# Patient Record
Sex: MALE | Race: OTHER | NOT HISPANIC OR LATINO | URBAN - METROPOLITAN AREA
[De-identification: names, ages, dates, MRNs, and addresses within clinical notes are randomized per-mention and may not be internally consistent; named-entity substitution may affect disease eponyms.]

---

## 2022-02-16 ENCOUNTER — EMERGENCY (EMERGENCY)
Facility: HOSPITAL | Age: 52
LOS: 1 days | Discharge: HOME | End: 2022-02-16
Attending: EMERGENCY MEDICINE | Admitting: EMERGENCY MEDICINE
Payer: MEDICAID

## 2022-02-16 VITALS
OXYGEN SATURATION: 99 % | TEMPERATURE: 98 F | DIASTOLIC BLOOD PRESSURE: 86 MMHG | RESPIRATION RATE: 17 BRPM | HEART RATE: 112 BPM | SYSTOLIC BLOOD PRESSURE: 155 MMHG

## 2022-02-16 VITALS
SYSTOLIC BLOOD PRESSURE: 143 MMHG | DIASTOLIC BLOOD PRESSURE: 81 MMHG | HEART RATE: 93 BPM | OXYGEN SATURATION: 100 % | TEMPERATURE: 98 F | RESPIRATION RATE: 17 BRPM

## 2022-02-16 LAB
ALBUMIN SERPL ELPH-MCNC: 4.9 G/DL — SIGNIFICANT CHANGE UP (ref 3.3–5)
ALP SERPL-CCNC: 72 U/L — SIGNIFICANT CHANGE UP (ref 40–120)
ALT FLD-CCNC: 24 U/L — SIGNIFICANT CHANGE UP (ref 4–41)
ANION GAP SERPL CALC-SCNC: 11 MMOL/L — SIGNIFICANT CHANGE UP (ref 7–14)
APPEARANCE UR: CLEAR — SIGNIFICANT CHANGE UP
AST SERPL-CCNC: 27 U/L — SIGNIFICANT CHANGE UP (ref 4–40)
BASOPHILS # BLD AUTO: 0.02 K/UL — SIGNIFICANT CHANGE UP (ref 0–0.2)
BASOPHILS NFR BLD AUTO: 0.3 % — SIGNIFICANT CHANGE UP (ref 0–2)
BILIRUB SERPL-MCNC: 0.3 MG/DL — SIGNIFICANT CHANGE UP (ref 0.2–1.2)
BILIRUB UR-MCNC: NEGATIVE — SIGNIFICANT CHANGE UP
BUN SERPL-MCNC: 16 MG/DL — SIGNIFICANT CHANGE UP (ref 7–23)
CALCIUM SERPL-MCNC: 9.1 MG/DL — SIGNIFICANT CHANGE UP (ref 8.4–10.5)
CHLORIDE SERPL-SCNC: 100 MMOL/L — SIGNIFICANT CHANGE UP (ref 98–107)
CO2 SERPL-SCNC: 24 MMOL/L — SIGNIFICANT CHANGE UP (ref 22–31)
COLOR SPEC: YELLOW — SIGNIFICANT CHANGE UP
CREAT SERPL-MCNC: 0.82 MG/DL — SIGNIFICANT CHANGE UP (ref 0.5–1.3)
DIFF PNL FLD: NEGATIVE — SIGNIFICANT CHANGE UP
EOSINOPHIL # BLD AUTO: 0.06 K/UL — SIGNIFICANT CHANGE UP (ref 0–0.5)
EOSINOPHIL NFR BLD AUTO: 1 % — SIGNIFICANT CHANGE UP (ref 0–6)
GLUCOSE SERPL-MCNC: 86 MG/DL — SIGNIFICANT CHANGE UP (ref 70–99)
GLUCOSE UR QL: NEGATIVE — SIGNIFICANT CHANGE UP
HCT VFR BLD CALC: 46.4 % — SIGNIFICANT CHANGE UP (ref 39–50)
HGB BLD-MCNC: 14.4 G/DL — SIGNIFICANT CHANGE UP (ref 13–17)
IANC: 3.93 K/UL — SIGNIFICANT CHANGE UP (ref 1.5–8.5)
IMM GRANULOCYTES NFR BLD AUTO: 0.6 % — SIGNIFICANT CHANGE UP (ref 0–1.5)
KETONES UR-MCNC: NEGATIVE — SIGNIFICANT CHANGE UP
LEUKOCYTE ESTERASE UR-ACNC: NEGATIVE — SIGNIFICANT CHANGE UP
LIDOCAIN IGE QN: 69 U/L — HIGH (ref 7–60)
LYMPHOCYTES # BLD AUTO: 1.68 K/UL — SIGNIFICANT CHANGE UP (ref 1–3.3)
LYMPHOCYTES # BLD AUTO: 27.1 % — SIGNIFICANT CHANGE UP (ref 13–44)
MCHC RBC-ENTMCNC: 22.8 PG — LOW (ref 27–34)
MCHC RBC-ENTMCNC: 31 GM/DL — LOW (ref 32–36)
MCV RBC AUTO: 73.4 FL — LOW (ref 80–100)
MONOCYTES # BLD AUTO: 0.46 K/UL — SIGNIFICANT CHANGE UP (ref 0–0.9)
MONOCYTES NFR BLD AUTO: 7.4 % — SIGNIFICANT CHANGE UP (ref 2–14)
NEUTROPHILS # BLD AUTO: 3.93 K/UL — SIGNIFICANT CHANGE UP (ref 1.8–7.4)
NEUTROPHILS NFR BLD AUTO: 63.6 % — SIGNIFICANT CHANGE UP (ref 43–77)
NITRITE UR-MCNC: NEGATIVE — SIGNIFICANT CHANGE UP
NRBC # BLD: 0 /100 WBCS — SIGNIFICANT CHANGE UP
NRBC # FLD: 0 K/UL — SIGNIFICANT CHANGE UP
PH UR: 7 — SIGNIFICANT CHANGE UP (ref 5–8)
PLATELET # BLD AUTO: 248 K/UL — SIGNIFICANT CHANGE UP (ref 150–400)
POTASSIUM SERPL-MCNC: 4.2 MMOL/L — SIGNIFICANT CHANGE UP (ref 3.5–5.3)
POTASSIUM SERPL-SCNC: 4.2 MMOL/L — SIGNIFICANT CHANGE UP (ref 3.5–5.3)
PROT SERPL-MCNC: 7.6 G/DL — SIGNIFICANT CHANGE UP (ref 6–8.3)
PROT UR-MCNC: ABNORMAL
RBC # BLD: 6.32 M/UL — HIGH (ref 4.2–5.8)
RBC # FLD: 17.3 % — HIGH (ref 10.3–14.5)
SODIUM SERPL-SCNC: 135 MMOL/L — SIGNIFICANT CHANGE UP (ref 135–145)
SP GR SPEC: 1.02 — SIGNIFICANT CHANGE UP (ref 1–1.05)
UROBILINOGEN FLD QL: SIGNIFICANT CHANGE UP
WBC # BLD: 6.19 K/UL — SIGNIFICANT CHANGE UP (ref 3.8–10.5)
WBC # FLD AUTO: 6.19 K/UL — SIGNIFICANT CHANGE UP (ref 3.8–10.5)

## 2022-02-16 PROCEDURE — 74177 CT ABD & PELVIS W/CONTRAST: CPT | Mod: 26,MA,59

## 2022-02-16 PROCEDURE — 99285 EMERGENCY DEPT VISIT HI MDM: CPT

## 2022-02-16 NOTE — ED PROVIDER NOTE - NSFOLLOWUPINSTRUCTIONS_ED_ALL_ED_FT
Advance activity as tolerated. Our discharge center will call you and help you make an appointment with a GI doctor for your CT findings and your slightly elevated lipase. Follow up with your primary care physician in 48-72 hours- bring copies of your results.  Return to the ER for worsening or persistent symptoms, and/or ANY NEW OR CONCERNING SYMPTOMS. If you have issues obtaining follow up, please call: [phone] to obtain a doctor or specialist who takes your insurance in your area.  You may call [phone] to make an appointment with the internal medicine clinic.

## 2022-02-16 NOTE — ED PROVIDER NOTE - PATIENT PORTAL LINK FT
You can access the FollowMyHealth Patient Portal offered by [facility] by registering at the following website: [email or web address]. By joining [facility]’s FollowMyHealth portal, you will also be able to view your health information using other applications (apps) compatible with our system.

## 2022-02-16 NOTE — ED PROVIDER NOTE - OBJECTIVE STATEMENT
52 yo M with no PMHx here with intermittent LLQ pain radiating to lower back x1 month, "worse after eating". Pt denies f/c, sob, co, N/V, diarrhea, constipation, back pain, paresthesias, weakness.

## 2022-02-16 NOTE — ED ADULT TRIAGE NOTE - CHIEF COMPLAINT QUOTE
Pt complaining of intermittent LLQ pain radiating to lower back x1 month, "worse after eating". Pt denies N/V, fever, chills.

## 2022-02-16 NOTE — ED ADULT NURSE NOTE - OBJECTIVE STATEMENT
Pt received AOx4, ambulatory at baseline presents to the ED w. chief complaint LLQ pain radiating to the lower back after 1 month. PT states pain is worse after eating. Denies NV, fever, chills, cough, SOB, CP at the moment. 20G placed in LAC.

## 2022-02-16 NOTE — ED PROVIDER NOTE - CLINICAL SUMMARY MEDICAL DECISION MAKING FREE TEXT BOX
labs, CT abdomen/pelvis with IV contrast due to intermittent Left sided flank pain for the past month with +CVA ttp to ensure that there is no lesion, reassess

## 2022-02-17 LAB
CULTURE RESULTS: NO GROWTH — SIGNIFICANT CHANGE UP
SPECIMEN SOURCE: SIGNIFICANT CHANGE UP

## 2022-02-18 ENCOUNTER — APPOINTMENT (OUTPATIENT)
Dept: GASTROENTEROLOGY | Facility: CLINIC | Age: 52
End: 2022-02-18
Payer: MEDICAID

## 2022-02-18 ENCOUNTER — LABORATORY RESULT (OUTPATIENT)
Age: 52
End: 2022-02-18

## 2022-02-18 VITALS
DIASTOLIC BLOOD PRESSURE: 97 MMHG | WEIGHT: 145 LBS | TEMPERATURE: 98.1 F | HEIGHT: 65 IN | OXYGEN SATURATION: 99 % | SYSTOLIC BLOOD PRESSURE: 159 MMHG | HEART RATE: 107 BPM | BODY MASS INDEX: 24.16 KG/M2

## 2022-02-18 DIAGNOSIS — Z82.49 FAMILY HISTORY OF ISCHEMIC HEART DISEASE AND OTHER DISEASES OF THE CIRCULATORY SYSTEM: ICD-10-CM

## 2022-02-18 DIAGNOSIS — Z78.9 OTHER SPECIFIED HEALTH STATUS: ICD-10-CM

## 2022-02-18 DIAGNOSIS — Z01.818 ENCOUNTER FOR OTHER PREPROCEDURAL EXAMINATION: ICD-10-CM

## 2022-02-18 PROBLEM — Z00.00 ENCOUNTER FOR PREVENTIVE HEALTH EXAMINATION: Status: ACTIVE | Noted: 2022-02-18

## 2022-02-18 PROCEDURE — 99204 OFFICE O/P NEW MOD 45 MIN: CPT

## 2022-02-18 RX ORDER — GEMFIBROZIL 600 MG/1
600 TABLET, FILM COATED ORAL TWICE DAILY
Qty: 60 | Refills: 3 | Status: ACTIVE | COMMUNITY
Start: 2022-02-18 | End: 1900-01-01

## 2022-02-18 NOTE — ED POST DISCHARGE NOTE - RESULT SUMMARY
CHIN Villalobos: a1c 6.7, pt denies hx of dm, informed of results. Recommend f/u with pmd for further management. No

## 2022-02-18 NOTE — REVIEW OF SYSTEMS
[Eyesight Problems] : eyesight problems [Abdominal Pain] : abdominal pain [Heartburn] : heartburn [Skin Lesions] : skin lesion [Negative] : Heme/Lymph

## 2022-02-18 NOTE — HISTORY OF PRESENT ILLNESS
[None] : had no significant interval events [Nausea] : denies nausea [Vomiting] : denies vomiting [Diarrhea] : denies diarrhea [Constipation] : denies constipation [Yellow Skin Or Eyes (Jaundice)] : denies jaundice [Rectal Pain] : denies rectal pain [Heartburn] : heartburn [Abdominal Pain] : abdominal pain [Abdominal Swelling] : abdominal swelling [GERD] : gastroesophageal reflux disease [Wt Gain ___ Lbs] : no recent weight gain [Wt Loss ___ Lbs] : no recent weight loss [Hiatus Hernia] : no hiatus hernia [Peptic Ulcer Disease] : no peptic ulcer disease [Pancreatitis] : no pancreatitis [Cholelithiasis] : no cholelithiasis [Kidney Stone] : no kidney stone [Inflammatory Bowel Disease] : no inflammatory bowel disease [Irritable Bowel Syndrome] : no irritable bowel syndrome [Diverticulitis] : no diverticulitis [Alcohol Abuse] : no alcohol abuse [Malignancy] : no malignancy [Abdominal Surgery] : no abdominal surgery [Cholecystectomy] : no cholecystectomy [de-identified] : The patient is a 52-year-old male with past medical history significant for hypertriglyceridemia who was referred to my office by Dr. Debby Rico for a history of pancreatitis noted during an emergency room evaluation. The patient also admits to having abdominal pain, dyspepsia and gastroesophageal reflux disease. I was asked to render an opinion for consultation for the above complaints.   The patient states that he is feeling uncomfortable.  The patient was recently evaluated at [facility] emergency room on February 16, 2022 for a 1 month history of left lower quadrant pain radiating to the lower back that exacerbated after meals.  The patient had blood work and imaging studies performed in the emergency room to assess the symptoms.  The blood work performed on February 16, 2022 revealed no evidence of anemia with a hemoglobin/hematocrit level of 14.4/46.4, respectively, normal liver enzymes with a total bilirubin of 0.3 mg/dL, a normal alkaline phosphatase/AST/ALT of 72/27/24 U/L, respectively, a mildly elevated lipase level of 69 U/L and an elevated hemoglobin A1c of 6.7%.  The urinalysis performed on February 16, 2022 revealed trace proteinuria.  The urine culture performed on February 16, 2022 revealed no growth.  The CAT scan of the abdomen pelvis with IV contrast performed on February 16, 2022 revealed a prominent pancreatic head without discrete mass suggestive of early pancreatitis.  Also noted was no evidence of diverticulitis.  The patient was discharged in stable condition and advised to follow-up in the office for further work-up and treatment. The patient complains of abdominal pain.  The patient describes the abdominal pain as a pressure, intermittent left lower quadrant discomfort that radiates to the back.  The abdominal pain is unrelated to passing gas or having bowel movements.  The abdominal pain is worse with meals.  The abdominal pain is described as mild in nature.  The abdominal pain occurs at night and in the morning.  The abdominal pain can occur at any time.   The abdominal pain has awakened the patient from sleep.  The abdominal pain is not relieved with any medications.  The abdominal pain is associated with abdominal gas and bloating.  The patient denies any nausea or vomiting.  The patient complains of gastroesophageal reflux disease but denies any dysphagia. The gastroesophageal reflux disease is worse after meals and late at night and in the early morning.  The gastroesophageal reflux disease is improved with proton pump inhibitors, H2 blockers and antacids.  The patient denies any atypical chest pain, shortness of breath or palpitations.  The patient denies any diaphoresis. The patient denies any constipation or diarrhea.  The patient has 1 bowel movement a day. The patient denies a change in bowel habits.  The patient denies a change in caliber of stool.  The patient denies having mucus discharge with the bowel movements.  The patient denies any bright red blood per rectum, melena or hematemesis.  The patient denies any rectal pain or rectal pruritus. The patient denies any weight loss or anorexia.  He denies any fevers or chills.  The patient denies any jaundice or pruritus.  The patient complains of occasional lower back pain.  The patient admits to having a prior upper endoscopy performed by another gastroenterologist.  According to the patient, the upper endoscopic findings revealed acid reflux.  The patient denies any significant family history of GI problems.   [de-identified] : (-) smoking, (+) binge ETOH use on the weekends, (-) IVDA\par

## 2022-02-18 NOTE — ASSESSMENT
[FreeTextEntry1] : Abdominal Pain: The patient complains of abdominal pain. The patient is to avoid nonsteroidal anti-inflammatory drugs and aspirin.  I recommend a low FOD-MAP diet.  I recommend a trial of Dicyclomine 10 mg tablet PO 3 times a day PRN for the abdominal pain.\par Dyspepsia: The patient complains of dyspeptic symptoms.  The patient was advised to abide by an anti-gas (low FOD-MAP) diet.  The patient was given a pamphlet for anti-gas (low FOD-MAP).  The patient and I reviewed the anti-gas (low FOD-MAP) diet at length. The patient is to start on a trial of Simethicone one tablet 4 times a day p.r.n. abdominal pain and gas.\par GERD: The patient was advised to avoid late-night meals and dietary indiscretions.  The patient was advised to avoid fried and fatty foods.  The patient was advised to abide by an anti-GERD diet. The patient was given a pamphlet for anti-GERD.  The patient and I reviewed the anti-GERD diet at length. I recommend a trial of Pantoprazole 40 mg once a day x 3 months for the symptoms.\par If the symptoms persist, the patient may require an upper endoscopy to assess for peptic ulcer disease versus esophagitis.  The patient was told of the risks and benefits of the procedure.  The patient was told of the risks of perforation, emergency surgery, bleeding, infections and missed lesions.  The patient agreed and will follow-up to reassess the symptoms.\par Abnormal Imaging Study: The CAT scan of the abdomen pelvis with IV contrast performed on February 16, 2022 revealed a prominent pancreatic head without discrete mass suggestive of early pancreatitis.  Also noted was no evidence of diverticulitis.  \par Prior ER Evaluation: The patient was recently evaluated at [facility] emergency room on February 16, 2022 for a 1 month history of left lower quadrant pain radiating to the lower back that exacerbated after meals.  The blood work performed on February 16, 2022 revealed no evidence of anemia with a hemoglobin/hematocrit level of 14.4/46.4, respectively, normal liver enzymes with a total bilirubin of 0.3 mg/dL, a normal alkaline phosphatase/AST/ALT of 72/27/24 U/L, respectively, a mildly elevated lipase level of 69 U/L and an elevated hemoglobin A1c of 6.7%.  The urinalysis performed on February 16, 2022 revealed trace proteinuria.  The urine culture performed on February 16, 2022 revealed no growth.  The CAT scan of the abdomen pelvis with IV contrast performed on February 16, 2022 revealed a prominent pancreatic head without discrete mass suggestive of early pancreatitis.  Also noted was no evidence of diverticulitis.  The patient was discharged in stable condition and advised to follow-up in the office for further work-up and treatment.\par R/O Pancreatitis: The recent evaluation at [facility] emergency room on February 16, 2022 for a 1 month history of left lower quadrant pain radiating to the lower back revealed evidence of mild pancreatitis.  The blood work performed on February 16, 2022 revealed no evidence of anemia with a hemoglobin/hematocrit level of 14.4/46.4, respectively, normal liver enzymes with a total bilirubin of 0.3 mg/dL, a normal alkaline phosphatase/AST/ALT of 72/27/24 U/L, respectively, a mildly elevated lipase level of 69 U/L and an elevated hemoglobin A1c of 6.7%.  The CAT scan of the abdomen pelvis with IV contrast performed on February 16, 2022 revealed a prominent pancreatic head without discrete mass suggestive of early pancreatitis.  Also noted was no evidence of diverticulitis.  The patient was discharged in stable condition and advised to follow-up in the office for further work-up and treatment.  The patient denies any EtOH abuse.  The patient denies taking any new medications or herbal supplements.  The patient denies any similar episodes in the past.  The patient denies any prior history of diabetes.  The patient denies any autoimmune problems.  The patient denies any family history of pancreatic cancer or pancreatitis.  I recommend avoid alcohol.  I recommend blood work to reassess the pancreas with an amylase and lipase level.  I recommend a lipid profile to assess for hypertriglyceridemia.  I recommend IgG 4 levels. I recommend an abdominal ultrasound to assess for gallstones as a possible cause of the acute pancreatitis.  The patient was advised to go to the emergency room if the abdominal pain recurs.  The patient admits to drinking alcohol prior to the symptoms.  The patient also has a history of hypertriglyceridemia.  The patient and I had a long discussion regarding acute pancreatitis and its complications.  The patient is aware of the gravity of the situation.  The patient was advised to avoid alcohol.  The patient was also told to avoid fried and fatty foods.  The patient was advised to followup with a nutritionist for a low-fat diet.  The patient had a long discussion regarding the risks of hypertriglyceridemia as a cause of hyperlipidemic pancreatitis.  I recommend a trial of Lopid 600 mg twice a day for the hypertriglyceridemia.  I recommend an abdominal ultrasound to assess for gallstones as a possible cause of the acute pancreatitis.  The patient was advised to go to the emergency room if the abdominal pain recurs.\par Colon Cancer screening: I recommend colonoscopy for colon cancer screening over the age of 45 to assess for colonic polyps. The patient was told of the risks and benefits of the procedure.  The patient was told of the risks of perforation, emergency surgery, bleeding, infections and missed lesions. The patient is to be on a clear liquid diet the entire day prior to the procedure. The patient is to complete the entire prescribed bowel prep the day prior to the procedure as directed. The patient is to have a COVID 19 PCR nasopharyngeal swab performed at the approved sites 3 days prior to the procedure.  The patient is told not to drive, drink alcohol, use recreational drugs, exercise, or work the day of the procedure.  The patient was told of the need for an escort to accompany the patient home after the procedure. The patient is aware that the procedure may be cancelled if they fail to follow the directions.  The patient agreed and will schedule for the procedure. The patient is to be n.p.o. after midnight and bowel prep was given.  The patient is to return for the procedure.\par Colonoscopy: I recommend a colonoscopy to assess the symptoms and for colonic polyps.  The patient was told of the risks and benefits of the procedure.  The patient was told of the risks of perforation, emergency surgery, bleeding, infections and missed lesions.  The patient is to be on a clear liquid diet the entire day prior to the procedure. The patient is to complete the entire prescribed bowel prep the day prior to the procedure as directed. The patient is to have a COVID 19 PCR nasopharyngeal swab performed at the approved sites 3 days prior to the procedure.  The patient is told not to drive, drink alcohol, use recreational drugs, exercise, or work the day of the procedure.  The patient was told of the need for an escort to accompany the patient home after the procedure. The patient is aware that the procedure may be cancelled if they fail to follow the directions.  The patient agreed and will schedule for the procedure.  The patient is to be n.p.o. after midnight and bowel prep was given.  The patient is to return for the procedure.\par Follow-up: The patient is to follow-up in the office in 4 weeks to reassess the symptoms. The patient was told to call the office if any further problems. \par \par

## 2022-02-22 ENCOUNTER — NON-APPOINTMENT (OUTPATIENT)
Age: 52
End: 2022-02-22

## 2022-02-22 LAB
25(OH)D3 SERPL-MCNC: 18.7 NG/ML
AFP-TM SERPL-MCNC: 2.5 NG/ML
ALBUMIN SERPL ELPH-MCNC: 4.8 G/DL
ALP BLD-CCNC: 76 U/L
ALT SERPL-CCNC: 27 U/L
AMYLASE/CREAT SERPL: 106 U/L
ANA SER IF-ACNC: NEGATIVE
ANION GAP SERPL CALC-SCNC: 18 MMOL/L
AST SERPL-CCNC: 22 U/L
BASOPHILS # BLD AUTO: 0.03 K/UL
BASOPHILS NFR BLD AUTO: 0.6 %
BILIRUB SERPL-MCNC: 0.5 MG/DL
BUN SERPL-MCNC: 19 MG/DL
CALCIUM SERPL-MCNC: 9 MG/DL
CANCER AG19-9 SERPL-ACNC: <2 U/ML
CEA SERPL-MCNC: 1.7 NG/ML
CHLORIDE SERPL-SCNC: 101 MMOL/L
CHOLEST SERPL-MCNC: 210 MG/DL
CO2 SERPL-SCNC: 21 MMOL/L
CREAT SERPL-MCNC: 0.91 MG/DL
CRP SERPL-MCNC: <3 MG/L
EOSINOPHIL # BLD AUTO: 0.09 K/UL
EOSINOPHIL NFR BLD AUTO: 1.7 %
ERYTHROCYTE [SEDIMENTATION RATE] IN BLOOD BY WESTERGREN METHOD: 24 MM/HR
ESTIMATED AVERAGE GLUCOSE: 140 MG/DL
FERRITIN SERPL-MCNC: 159 NG/ML
GGT SERPL-CCNC: 32 U/L
GLIADIN IGA SER QL: <5 UNITS
GLIADIN IGG SER QL: <5 UNITS
GLIADIN PEPTIDE IGA SER-ACNC: NEGATIVE
GLIADIN PEPTIDE IGG SER-ACNC: NEGATIVE
GLUCOSE SERPL-MCNC: 121 MG/DL
HBA1C MFR BLD HPLC: 6.5 %
HBV CORE IGG+IGM SER QL: NONREACTIVE
HBV CORE IGM SER QL: NONREACTIVE
HBV E AB SER QL: NONREACTIVE
HBV E AG SER QL: NONREACTIVE
HBV SURFACE AB SER QL: NONREACTIVE
HBV SURFACE AG SER QL: NONREACTIVE
HCT VFR BLD CALC: 44.1 %
HCV AB SER QL: NONREACTIVE
HCV S/CO RATIO: 0.13 S/CO
HDLC SERPL-MCNC: 36 MG/DL
HEPATITIS A IGG ANTIBODY: REACTIVE
HGB BLD-MCNC: 13.3 G/DL
IGA SER QL IEP: 184 MG/DL
IGG SER QL IEP: 1038 MG/DL
IMM GRANULOCYTES NFR BLD AUTO: 0.6 %
IRON SATN MFR SERPL: 36 %
IRON SERPL-MCNC: 126 UG/DL
LDH SERPL-CCNC: 170 U/L
LDLC SERPL CALC-MCNC: NORMAL MG/DL
LPL SERPL-CCNC: 37 U/L
LYMPHOCYTES # BLD AUTO: 1.48 K/UL
LYMPHOCYTES NFR BLD AUTO: 27.9 %
MAN DIFF?: NORMAL
MCHC RBC-ENTMCNC: 23.1 PG
MCHC RBC-ENTMCNC: 30.2 GM/DL
MCV RBC AUTO: 76.6 FL
MONOCYTES # BLD AUTO: 0.4 K/UL
MONOCYTES NFR BLD AUTO: 7.5 %
NEUTROPHILS # BLD AUTO: 3.27 K/UL
NEUTROPHILS NFR BLD AUTO: 61.7 %
NONHDLC SERPL-MCNC: 174 MG/DL
PLATELET # BLD AUTO: 234 K/UL
POTASSIUM SERPL-SCNC: 4.1 MMOL/L
PROT SERPL-MCNC: 7.4 G/DL
RBC # BLD: 5.76 M/UL
RBC # FLD: 18.2 %
RHEUMATOID FACT SER QL: <10 IU/ML
SODIUM SERPL-SCNC: 139 MMOL/L
TIBC SERPL-MCNC: 351 UG/DL
TRIGL SERPL-MCNC: 558 MG/DL
TSH SERPL-ACNC: 0.93 UIU/ML
TTG IGA SER IA-ACNC: <1.2 U/ML
TTG IGA SER-ACNC: NEGATIVE
TTG IGG SER IA-ACNC: <1.2 U/ML
TTG IGG SER IA-ACNC: NEGATIVE
UIBC SERPL-MCNC: 225 UG/DL
VIT B12 SERPL-MCNC: 388 PG/ML
WBC # FLD AUTO: 5.3 K/UL

## 2022-03-04 ENCOUNTER — APPOINTMENT (OUTPATIENT)
Dept: ULTRASOUND IMAGING | Facility: HOSPITAL | Age: 52
End: 2022-03-04
Payer: MEDICAID

## 2022-03-04 ENCOUNTER — NON-APPOINTMENT (OUTPATIENT)
Age: 52
End: 2022-03-04

## 2022-03-04 ENCOUNTER — OUTPATIENT (OUTPATIENT)
Dept: OUTPATIENT SERVICES | Facility: HOSPITAL | Age: 52
LOS: 1 days | End: 2022-03-04
Payer: MEDICAID

## 2022-03-04 DIAGNOSIS — Z87.19 PERSONAL HISTORY OF OTHER DISEASES OF THE DIGESTIVE SYSTEM: ICD-10-CM

## 2022-03-04 PROCEDURE — 76700 US EXAM ABDOM COMPLETE: CPT

## 2022-03-04 PROCEDURE — 76700 US EXAM ABDOM COMPLETE: CPT | Mod: 26

## 2022-05-20 ENCOUNTER — APPOINTMENT (OUTPATIENT)
Dept: GASTROENTEROLOGY | Facility: CLINIC | Age: 52
End: 2022-05-20

## 2024-03-06 ENCOUNTER — LABORATORY RESULT (OUTPATIENT)
Age: 54
End: 2024-03-06

## 2024-03-06 ENCOUNTER — APPOINTMENT (OUTPATIENT)
Dept: GASTROENTEROLOGY | Facility: CLINIC | Age: 54
End: 2024-03-06
Payer: MEDICAID

## 2024-03-06 VITALS
HEIGHT: 65 IN | BODY MASS INDEX: 24.32 KG/M2 | WEIGHT: 146 LBS | HEART RATE: 76 BPM | SYSTOLIC BLOOD PRESSURE: 157 MMHG | DIASTOLIC BLOOD PRESSURE: 77 MMHG | OXYGEN SATURATION: 99 % | TEMPERATURE: 98 F

## 2024-03-06 DIAGNOSIS — R10.32 LEFT LOWER QUADRANT PAIN: ICD-10-CM

## 2024-03-06 DIAGNOSIS — E78.2 MIXED HYPERLIPIDEMIA: ICD-10-CM

## 2024-03-06 PROCEDURE — 99204 OFFICE O/P NEW MOD 45 MIN: CPT

## 2024-03-06 RX ORDER — FAMOTIDINE 40 MG/1
40 TABLET, FILM COATED ORAL
Qty: 60 | Refills: 3 | Status: ACTIVE | COMMUNITY
Start: 2024-03-06 | End: 1900-01-01

## 2024-03-06 RX ORDER — SIMETHICONE 125 MG/1
125 TABLET, CHEWABLE ORAL
Qty: 120 | Refills: 3 | Status: ACTIVE | COMMUNITY
Start: 2024-03-06 | End: 1900-01-01

## 2024-03-06 RX ORDER — DICYCLOMINE HYDROCHLORIDE 10 MG/1
10 CAPSULE ORAL 3 TIMES DAILY
Qty: 90 | Refills: 3 | Status: ACTIVE | COMMUNITY
Start: 2024-03-06 | End: 1900-01-01

## 2024-03-06 NOTE — ASSESSMENT
[FreeTextEntry1] : Abdominal Pain: The patient complains of abdominal pain. The patient is to avoid nonsteroidal anti-inflammatory drugs and aspirin.  I recommend a low FOD-MAP diet.  I recommend a trial of Dicyclomine 10 mg tablet PO 3 times a day PRN for the abdominal pain. Dyspepsia: The patient complains of dyspeptic symptoms.  The patient was advised to abide by an anti-gas (low FOD-MAP) diet.  The patient was given a pamphlet for anti-gas (low FOD-MAP).  The patient and I reviewed the anti-gas (low FOD-MAP) diet at length. The patient is to start on a trial of Simethicone one tablet 4 times a day p.r.n. abdominal pain and gas. GERD: The patient was advised to avoid late-night meals and dietary indiscretions.  The patient was advised to avoid fried and fatty foods.  The patient was advised to abide by an anti-GERD diet. The patient was given a pamphlet for anti-GERD.  The patient and I reviewed the anti-GERD diet at length. I recommend a trial of famotidine 40 mg twice a day x 3 months for the symptoms. Abnormal Imaging Study: The CAT scan of the abdomen and pelvis with IV contrast performed on February 16, 2022 revealed a prominent pancreatic head without discrete mass suggestive of early pancreatitis. Also noted was no evidence of diverticulitis.  History of Pancreatitis: The patient has a prior history of ETOH abuse. The patient stopped drinking alcohol for several years. The patient has a history of hypertriglyceridemia.  The patient and I had a long discussion regarding acute pancreatitis and its possible complications.  The patient is aware of the gravity of the situation.  The patient was advised to continue to avoid alcohol.  The patient and I had a long discussion regarding the risks of hypertriglyceridemia as a cause of hyperlipidemic pancreatitis. The patient was also told to avoid fried and fatty foods.  The patient was advised to follow-up with a nutritionist for a low-fat diet.  I recommend a trial of Lopid (Gemfibrozil) 600 mg twice a day for the hypertriglyceridemia.  I recommend a CAT scan of the abdomen and pelvis with IV contrast to assess the etiology of the acute pancreatitis and possible complications.  The patient was advised to go to the emergency room if the abdominal pain recurs.  The patient agrees and will follow-up. History of Hypertriglyceridemia:  The patient has a history of hypertriglyceridemia.  The patient and I had a long discussion regarding acute pancreatitis and its possible complications.  The patient is aware of the gravity of the situation.  The patient was advised to continue to avoid alcohol.  The patient and I had a long discussion regarding the risks of hypertriglyceridemia as a cause of hyperlipidemic pancreatitis. The patient was also told to avoid fried and fatty foods.  The patient was advised to follow-up with a nutritionist for a low-fat diet.  I recommend a trial of Lopid (Gemfibrozil) 600 mg twice a day for the hypertriglyceridemia.  I recommend a CAT scan of the abdomen and pelvis with IV contrast to assess the etiology of the acute pancreatitis and possible complications.  The patient was advised to go to the emergency room if the abdominal pain recurs.  The patient agrees and will follow-up. Blood Work: I recommend blood work to assess the liver. I recommend a CBC, SMA 24, amylase, lipase, ESR, TFTs, PATRICK, rheumatoid factor, celiac sprue panel, IgA, profile for hepatitis A, B, C. ,AFP, alpha 1 anti-trypsin  antibody, ceruloplasmin level, iron, TIBC, ferritin level, AMA, anti smooth muscle antibody and CEA, .  I also recommend obtaining the recent blood work performed by the patient's PMD. Imaging Study: I recommend an imaging study to assess the symptoms. I recommend a CAT scan of the abdomen and pelvis with contrast to assess the liver parenchyma and for liver lesions. to assess the pancreas for pancreatic cystic lesions. Colon Cancer screening: I recommend colonoscopy for colon cancer screening over the age of 45 to assess for colonic polyps. The patient was told of the risks and benefits of the procedure.  The patient was told of the risks of perforation, emergency surgery, bleeding, infections and missed lesions. The patient is to be on a clear liquid diet the entire day prior to the procedure. The patient is to complete the entire prescribed bowel prep the day prior to the procedure as directed. The patient is to have a COVID 19 PCR nasopharyngeal swab performed at the approved sites 3 days prior to the procedure.  The patient is told not to drive, drink alcohol, use recreational drugs, exercise, or work the day of the procedure.  The patient was told of the need for an escort to accompany the patient home after the procedure. The patient is aware that the procedure may be cancelled if they fail to follow the directions.  The patient agreed and will schedule for the procedure. The patient can take the antihypertensive medication with a sip of water one hour prior to the procedure. The patient is to hold the diabetic medication the day before and the morning of the procedure. The patient is to hold the blood thinner medication for 7 days prior to the procedure. The patient is to be n.p.o. after midnight and bowel prep was given.  The patient is to return for the procedure. Colonoscopy: I recommend a colonoscopy to assess the symptoms and for colonic polyps.  The patient was told of the risks and benefits of the procedure.  The patient was told of the risks of perforation, emergency surgery, bleeding, infections and missed lesions.  The patient is to be on a clear liquid diet the entire day prior to the procedure. The patient is to complete the entire prescribed bowel prep the day prior to the procedure as directed. The patient is to have a COVID 19 PCR nasopharyngeal swab performed at the approved sites 3 days prior to the procedure.  The patient is told not to drive, drink alcohol, use recreational drugs, exercise, or work the day of the procedure.  The patient was told of the need for an escort to accompany the patient home after the procedure. The patient is aware that the procedure may be cancelled if they fail to follow the directions.  The patient agreed and will schedule for the procedure. The patient can take the antihypertensive medication with a sip of water one hour prior to the procedure. The patient is to hold the diabetic medication the day before and the morning of the procedure. The patient is to hold the blood thinner medication for 7 days prior to the procedure. The patient is to be n.p.o. after midnight and bowel prep was given.  The patient is to return for the procedure. Upper Endoscopy: I recommend an upper endoscopy to assess for peptic ulcer disease versus esophagitis.  The patient was told of the risks and benefits of the procedure.  The patient was told of the risks of perforation, emergency surgery, bleeding, infections and missed lesions. The patient is to have a COVID 19 PCR nasopharyngeal swab performed at the approved sites 3 days prior to the procedure.  The patient is told not to drive, drink alcohol, use recreational drugs, exercise, or work the day of the procedure.  The patient was told of the need for an escort to accompany the patient home after the procedure. The patient is aware that the procedure may be cancelled if they fail to follow the directions.  The patient agreed and will schedule for the procedure. The patient can take the antihypertensive medication with a sip of water one hour prior to the procedure. The patient is to hold the diabetic medication the day the morning of the procedure. The patient is to be n.p.o. after midnight.  The patient is to return for the procedure. Follow-up: The patient is to follow-up in the office in 4 weeks to reassess the symptoms. The patient was told to call the office if any further problems.

## 2024-03-06 NOTE — HISTORY OF PRESENT ILLNESS
[FreeTextEntry1] : The patient is a 53-year-old male with past medical history significant for hypertriglyceridemia on fenofibrate who was referred to my office by Dr. Munson for abdominal pain, dyspepsia and gastroesophageal reflux disease. The patient also admits to having abdominal pain, dyspepsia, gastroesophageal reflux disease, dysphagia, atypical chest pain, nausea/vomiting, alternating diarrhea/constipation, change in bowel habits, change in caliber of stool, lower GI bleeding. The patient also came to the office for evaluation for colon cancer screening. I was asked to render an opinion for consultation for the above complaints.   The patient states that he is feeling uncomfortable x 7 months.  The patient denies any prior exposure to hepatitis A, B or C.  The patient denies any large doses of nonsteroidal anti-inflammatory drugs or acetaminophen.   The patient denies sharing needles, razors, nail clippers, nail files, scissors, et cetera.  The patient admits to prior EtOH abuse but denies any cocaine use and intravenous drug use.  The patient denies any prior surgery, blood transfusions, sexual indiscretions, tattoos or piercing.  The patient denies any family history of GI or liver problems.  The patient complains of abdominal pain.  The patient describes the abdominal pain as a pressure, intermittent left lower quadrant discomfort that occasionally radiates to the back.  The abdominal pain is unrelated to stress.  The abdominal pain is worse with meals and improves with passing gas or having a bowel movement.  The abdominal pain is described as being mild in nature.  The abdominal pain occurs at night and in the morning.  The abdominal pain can occur at any time.   The abdominal pain never has awakened the patient from sleep.  The abdominal pain is slightly relieved with certain medication such as pump inhibitors, H2 blockers and antacids.  The abdominal pain is associated with abdominal gas and bloating.  The patient denies any nausea or vomiting.  The patient complains of gastroesophageal reflux disease but denies any dysphagia. The gastroesophageal reflux disease is worse after meals and late at night and in the early morning.  The gastroesophageal reflux disease is improved with proton pump inhibitors, H2 blockers and antacids.  The patient denies any atypical chest pain, shortness of breath or palpitations.  The patient admits to occasional episodes of diaphoresis.  The patient denies any constipation or diarrhea.  The patient has 1 bowel movement a day. The patient denies a change in bowel habits.  The patient denies a change in caliber of stool.  The patient denies having mucus discharge with the bowel movements.  The patient denies any bright red blood per rectum, melena or hematemesis.  The patient denies any rectal pain or rectal pruritus. The patient denies any weight loss or anorexia.  He denies any fevers or chills.  The patient denies any jaundice or pruritus.  The patient denies any back pain.  The abdominal ultrasound performed on March 4, 2022 revealed a normal abdominal ultrasound and a common bile duct that is in the upper limit of normal.  The blood test performed on February 18, 2022 revealed a normal alpha-fetoprotein level of 2.5 ng/mL, no evidence of anemia with a hemoglobin/hematocrit level of 13.3/44.1, respectively, a normal CEA level of 1.7 ng/mL, a normal CA 19-9 of <2 U/ml, a low CO2 level of 21 mmol/L, and elevated anion gap of 18 mmol/L, and elevated blood glucose of 121 mg/dL, normal liver enzymes with a total bilirubin of 0.5 mg/dL, a normal alkaline phosphatase/AST/ALT/GGTP of 76/22/27/32 U/L, respectively, a normal amylase/lipase level of 106/37 U/L, respectively, a normal C-reactive protein of <3 mg/L, a normal serum iron level of 126 mcg/dL, a normal TIBC/U IBC of 351/225 mcg/dL, respectively, a normal iron percent saturation of 36%, a normal ferritin level of 159 ng/dL a normal serum IgA level of 184 mg/dL, a negative transglutaminase IgG antibody EIA of < 1.2 U/ml, a negative transglutaminase IgA antibody EIA of 1.2 U/ml, a negative Gliadin Deamidated IgG antibody of < 5.0 units, a negative Gliadin Deamidated IgA antibody of < 5.0 units, a reactive hepatitis A IgG antibody, a normal LDH of 170 U/L, and elevated triglyceride level of 558 mg/dL, and elevated total cholesterol of 210 mg/dL, a normal rheumatoid factor of <10 IU/ml, a normal serum IgG level of 1038 mg/dL, a normal TSH of 0.93 uIU/ml, a low vitamin D level of 18.7 ng/mL, a normal vitamin B12 level of 388 pg/mL, and elevated hemoglobin A1c of 6.5% with an estimated average glucose of 140 mg/dL and an elevated ESR 24 mm/h.  The patient was previously evaluated at [facility] emergency room on February 16, 2022 for a 1 month history of left lower quadrant pain radiating to the lower back that exacerbated after meals. The patient had blood work and imaging studies performed in the emergency room to assess the symptoms. The blood work performed on February 16, 2022 revealed no evidence of anemia with a hemoglobin/hematocrit level of 14.4/46.4, respectively, normal liver enzymes with a total bilirubin of 0.3 mg/dL, a normal alkaline phosphatase/AST/ALT of 72/27/24 U/L, respectively, a mildly elevated lipase level of 69 U/L and an elevated hemoglobin A1c of 6.7%. The urinalysis performed on February 16, 2022 revealed trace proteinuria. The urine culture performed on February 16, 2022 revealed no growth. The CAT scan of the abdomen and pelvis with IV contrast performed on February 16, 2022 revealed a prominent pancreatic head without discrete mass suggestive of early pancreatitis. Also noted was no evidence of diverticulitis. The patient was discharged in stable condition and advised to follow-up in the office for further work-up and treatment. The patient admits to having a prior upper endoscopy performed by another gastroenterologist. According to the patient, the upper endoscopic findings revealed acid reflux. The patient denies any significant family history of GI problems.    (-) smoking, (+)  prior ETOH use, (-) IVDA  Physical Exam: General Appearance: Well developed, well nourished, no acute distress ENT:  nose clear, ears unremarkable Eyes: No enteric sclera, conjunctiva clear. Neck: Supple, without masses  Respiratory: Breath sounds equal and bilateral, no wheezing no rales or rhonchi Cardiovascular: S1-S2 audible, no murmur, no rubs or gallops GI: (+) BS, soft, tender in the left upper and lower quadrant, no rebound, no guarding, no masses Liver: liver edge palpated Rectal: not done Musculo-skeletal: Good motor strength, good range of motion, normal appearing extremities Skin: Normal appearing skin, no jaundice, no rashes or nodules Neurological: without focal motor or sensory deficits Patient is moving all extremities spontaneously and to command with normal muscle strength alert and oriented X3 Psychiatric: Good affect, not depressed, (+) anxious  [de-identified] : The CAT scan of the abdomen and pelvis with IV contrast performed on February 16, 2022 revealed a prominent pancreatic head without discrete mass suggestive of early pancreatitis. Also noted was no evidence of diverticulitis.  [de-identified] : The abdominal ultrasound performed on March 4, 2022 revealed a normal abdominal ultrasound and a common bile duct that is in the upper limit of normal.

## 2024-03-07 ENCOUNTER — NON-APPOINTMENT (OUTPATIENT)
Age: 54
End: 2024-03-07

## 2024-03-08 ENCOUNTER — NON-APPOINTMENT (OUTPATIENT)
Age: 54
End: 2024-03-08

## 2024-04-05 ENCOUNTER — NON-APPOINTMENT (OUTPATIENT)
Age: 54
End: 2024-04-05

## 2024-04-24 ENCOUNTER — NON-APPOINTMENT (OUTPATIENT)
Age: 54
End: 2024-04-24

## 2024-06-19 ENCOUNTER — APPOINTMENT (OUTPATIENT)
Dept: GASTROENTEROLOGY | Facility: CLINIC | Age: 54
End: 2024-06-19
Payer: COMMERCIAL

## 2024-06-19 VITALS
WEIGHT: 145 LBS | OXYGEN SATURATION: 98 % | TEMPERATURE: 98.1 F | HEIGHT: 65 IN | SYSTOLIC BLOOD PRESSURE: 160 MMHG | BODY MASS INDEX: 24.16 KG/M2 | DIASTOLIC BLOOD PRESSURE: 78 MMHG | HEART RATE: 90 BPM

## 2024-06-19 DIAGNOSIS — R93.89 ABNORMAL FINDINGS ON DIAGNOSTIC IMAGING OF OTHER SPECIFIED BODY STRUCTURES: ICD-10-CM

## 2024-06-19 DIAGNOSIS — R10.13 EPIGASTRIC PAIN: ICD-10-CM

## 2024-06-19 DIAGNOSIS — K21.9 GASTRO-ESOPHAGEAL REFLUX DISEASE W/OUT ESOPHAGITIS: ICD-10-CM

## 2024-06-19 DIAGNOSIS — Z87.19 PERSONAL HISTORY OF OTHER DISEASES OF THE DIGESTIVE SYSTEM: ICD-10-CM

## 2024-06-19 DIAGNOSIS — Z12.11 ENCOUNTER FOR SCREENING FOR MALIGNANT NEOPLASM OF COLON: ICD-10-CM

## 2024-06-19 PROCEDURE — 99214 OFFICE O/P EST MOD 30 MIN: CPT

## 2024-06-19 RX ORDER — FAMOTIDINE 40 MG/1
40 TABLET, FILM COATED ORAL
Qty: 60 | Refills: 3 | Status: ACTIVE | COMMUNITY
Start: 2024-06-19 | End: 1900-01-01

## 2024-06-19 RX ORDER — SODIUM PICOSULFATE, MAGNESIUM OXIDE, AND ANHYDROUS CITRIC ACID 12; 3.5; 1 G/175ML; G/175ML; MG/175ML
10-3.5-12 MG-GM LIQUID ORAL TWICE DAILY
Qty: 2 | Refills: 0 | Status: ACTIVE | COMMUNITY
Start: 2024-06-19 | End: 1900-01-01

## 2024-06-19 NOTE — HISTORY OF PRESENT ILLNESS
[de-identified] : The CAT scan of the abdomen and pelvis with IV contrast performed on March 26, 2024 revealed no acute CT abnormalities of the abdomen/pelvis. Also noted was small fat-containing bilateral inguinal hernias.    The CAT scan of the abdomen and pelvis with IV contrast performed on February 16, 2022 revealed a prominent pancreatic head without discrete mass suggestive of early pancreatitis. Also noted was no evidence of diverticulitis.  [FreeTextEntry1] : The patient has a past medical history significant for hypertriglyceridemia on fenofibrate.  The patient is being followed by Dr. Munson. The patient also came to the office for evaluation for colon cancer screening. The patient denies any prior exposure to hepatitis A, B or C. The patient denies any large doses of nonsteroidal anti-inflammatory drugs or acetaminophen. The patient denies sharing needles, razors, nail clippers, nail files, scissors, et cetera. The patient admits to prior EtOH abuse but denies any cocaine use and intravenous drug use. The patient denies any prior surgery, blood transfusions, sexual indiscretions, tattoos or piercing. The patient denies any family history of GI or liver problems. The patient states that he is feeling uncomfortable. The patient denies any jaundice or pruritus.  The patient complains of occasional lower back pain. The patient denies any abdominal pain.  The patient complains of abdominal gas and bloating.  The patient denies any nausea or vomiting.  The patient complains of gastroesophageal reflux disease but denies any dysphagia.  The gastroesophageal reflux disease is worse after meals and late at night and in the early morning. The gastroesophageal reflux disease is improved with proton pump inhibitors, H2 blockers and antacids.   The patient denies any atypical chest pain, shortness of breath or palpitations.  The patient denies any diaphoresis. The patient denies any constipation or diarrhea.  The patient has 1 bowel movement a day. The patient denies a change in bowel habits.  The patient denies a change in caliber of stool.  The patient denies having mucus discharge with the bowel movements.  The patient denies any bright red blood per rectum, melena or hematemesis.  The patient denies any rectal pain or rectal pruritus.  The patient denies any weight loss or anorexia.  He denies any fevers or chills.  The patient denies any back pain. The abdominal ultrasound performed on March 4, 2022 revealed a normal abdominal ultrasound and a common bile duct that is in the upper limit of normal. The CAT scan of the abdomen and pelvis with IV contrast performed on March 26, 2024 revealed no acute CT abnormalities of the abdomen/pelvis. Also noted was small fat-containing bilateral inguinal hernias.  The blood work performed on March 6, 2024 revealed no evidence of anemia with a hemoglobin/hematocrit level of 13.4/43.3, respectively, an elevated ESR of 23 mm/h, an elevated hemoglobin A1c of 6.1% with an estimated average glucose of 128 mg/dL, a normal ferritin level of 197 ng/mL, a normal TSH of 0.67u IU/mL, a low vitamin D level of 24.1 ng/mL, normal CEA level 2.0 ng/mL, a normal alpha-fetoprotein level of 3.2 ng/mL, a normal CA 19-9 of <2 U/mL, a normal serum iron level of 103 mcg/dL, and elevated TIBC/UIBC of 490/387 mcg/dL, respectively, a normal iron percent saturation 21%, and elevated sodium level of 146 mmol/L, and elevated potassium level of 5.4 mmol/L, a low CO2 of 16 mmol/L, and elevated anion gap at 25 mmol/L, a low albumin level of 5.2 g/dL, normal liver enzymes with a total bilirubin of 0.2 mg/dL, a normal alkaline phosphatase/AST/ALT/GGTP of 42/27/23/18 U/L, respectively. A normal ferritin level of 197 ng/mL, normal TSH of 0.67u IU/mL, a low vitamin D level of 24.1 ng/mL, a negative Endomysial IgA antibody of 1:10, a negative transglutaminase IgG antibody EIA of 2.6 U/ml, a negative transglutaminase IgA antibody EIA of <1.2 U/ml, a negative Gliadin Deamidated IgG antibody of < 5.0 units, a negative Gliadin Deamidated IgA antibody of < 5.0 units, a normal serum IgA level of 202 mg/dL, A negative PATRICK, a negative antimitochondrial antibody of <1: 20, a positive anti-smooth muscle antibody of 1: 20, a negative rheumatoid factor <10 IU/mL, and elevated total cholesterol/triglyceride level of 233/187 mg/dL, respectively. The hepatitis profile performed on March 6, 2024 revealed a reactive hepatitis A IgG antibody, a nonreactive hepatitis A IgM antibody, a nonreactive hepatitis B total core antibody, a nonreactive hepatitis B core IgM antibody, a nonreactive hepatitis B surface antibody, a nonreactive hepatitis B surface antigen, a nonreactive hepatitis B E antigen, a nonreactive hepatitis B E antibody, a nonreactive hepatitis C antibody. The patient was previously evaluated at [facility] emergency room on February 16, 2022 for a 1 month history of left lower quadrant pain radiating to the lower back that exacerbated after meals. The patient had blood work and imaging studies performed in the emergency room to assess the symptoms. The blood work performed on February 16, 2022 revealed no evidence of anemia with a hemoglobin/hematocrit level of 14.4/46.4, respectively, normal liver enzymes with a total bilirubin of 0.3 mg/dL, a normal alkaline phosphatase/AST/ALT of 72/27/24 U/L, respectively, a mildly elevated lipase level of 69 U/L and an elevated hemoglobin A1c of 6.7%. The urinalysis performed on February 16, 2022 revealed trace proteinuria. The urine culture performed on February 16, 2022 revealed no growth. The CAT scan of the abdomen and pelvis with IV contrast performed on February 16, 2022 revealed a prominent pancreatic head without discrete mass suggestive of early pancreatitis. Also noted was no evidence of diverticulitis. The patient was discharged in stable condition and advised to follow-up in the office for further work-up and treatment. The patient admits to having a prior upper endoscopy performed by another gastroenterologist. According to the patient, the upper endoscopic findings revealed acid reflux. The patient denies any significant family history of GI problems.  (-) smoking, (+) prior ETOH use, (-) IVDA  Physical Exam: General Appearance:  no acute distress ENT: nose clear, ears unremarkable Eyes: No enteric sclera, conjunctiva clear. Neck: Supple, without masses Respiratory: Breath sounds equal and bilateral, no wheezing no rales or rhonchi Cardiovascular: S1-S2 audible, no murmur, no rubs or gallops GI: (+) BS, soft, tender diffusely, no rebound, no guarding, no masses Liver: liver edge palpated Musculo-skeletal: Good motor strength, good range of motion, normal appearing extremities Skin: Normal appearing skin, no jaundice, no rashes or nodules Neurological: without focal motor or sensory deficits Patient is moving all extremities spontaneously and to command with normal muscle strength alert and oriented X3 Psychiatric: Good affect, not depressed, (+) anxious  [de-identified] : The abdominal ultrasound performed on March 4, 2022 revealed a normal abdominal ultrasound and a common bile duct that is in the upper limit of normal.

## 2024-06-19 NOTE — ASSESSMENT
[FreeTextEntry1] : Dyspepsia: The patient complains of dyspeptic symptoms.  The patient was advised to continue to abide by an anti-gas (low FOD-MAP) diet.  The patient was previously given a pamphlet for anti-gas (low FOD-MAP).  The patient and I reviewed the anti-gas (low FOD-MAP)diet at length again. The patient is to continue on a trial of Simethicone one tablet 4 times a day p.r.n. abdominal pain and gas. GERD: The patient was advised to avoid late-night meals and dietary indiscretions.  The patient was advised to avoid fried and fatty foods.  The patient was advised to abide by an anti-GERD diet. The patient was given a pamphlet for anti-GERD.  The patient and I reviewed the anti-GERD diet at length. I recommend a trial of famotidine 40 mg twice a day x 3 months for the symptoms. Abnormal Imaging Study: The CAT scan of the abdomen and pelvis with IV contrast performed on February 16, 2022 revealed a prominent pancreatic head without discrete mass suggestive of early pancreatitis. Also noted was no evidence of diverticulitis.  The repeat CAT scan of the abdomen and pelvis with IV contrast performed on March 26, 2024 revealed no acute CT abnormalities of the abdomen/pelvis. Also noted was small fat-containing bilateral inguinal hernias.   History of Pancreatitis: The patient has a prior history of ETOH abuse. The patient stopped drinking alcohol for several years. The patient has a history of hypertriglyceridemia. The patient and I had a long discussion regarding acute pancreatitis and its possible complications. The patient is aware of the gravity of the situation. The patient was advised to continue to avoid alcohol. The patient and I had a long discussion regarding the risks of hypertriglyceridemia as a cause of hyperlipidemic pancreatitis. The patient was also told to avoid fried and fatty foods. The patient was advised to follow-up with a nutritionist for a low-fat diet. I recommend a trial of Lopid (Gemfibrozil) 600 mg twice a day for the hypertriglyceridemia. I recommend a CAT scan of the abdomen and pelvis with IV contrast to assess the etiology of the acute pancreatitis and possible complications. The patient was advised to go to the emergency room if the abdominal pain recurs. The patient agrees and will follow-up. History of Hypertriglyceridemia: The patient has a history of hypertriglyceridemia. The patient and I had a long discussion regarding acute pancreatitis and its possible complications. The patient is aware of the gravity of the situation. The patient was advised to continue to avoid alcohol. The patient and I had a long discussion regarding the risks of hypertriglyceridemia as a cause of hyperlipidemic pancreatitis. The patient was also told to avoid fried and fatty foods. The patient was advised to follow-up with a nutritionist for a low-fat diet. I recommend a trial of Lopid (Gemfibrozil) 600 mg twice a day for the hypertriglyceridemia. I recommend a CAT scan of the abdomen and pelvis with IV contrast to assess the etiology of the acute pancreatitis and possible complications. The patient was advised to go to the emergency room if the abdominal pain recurs. The patient agrees and will follow-up. Colon Cancer screening: I recommend colonoscopy for colon cancer screening over the age of 45 to assess for colonic polyps. The patient was told of the risks and benefits of the procedure. The patient was told of the risks of perforation, emergency surgery, bleeding, infections and missed lesions. The patient is to be on a clear liquid diet the entire day prior to the procedure. The patient is to complete the entire prescribed bowel prep the day prior to the procedure as directed.  The patient is told not to drive, drink alcohol, use recreational drugs, exercise, or work the day of the procedure. The patient was told of the need for an escort to accompany the patient home after the procedure. The patient is aware that the procedure may be cancelled if they fail to follow the directions. The patient agreed and will schedule for the procedure. . The patient is to be n.p.o. after midnight and bowel prep was given. The patient is to return for the procedure. Colonoscopy: I recommend a colonoscopy to assess the symptoms and for colonic polyps. The patient was told of the risks and benefits of the procedure. The patient was told of the risks of perforation, emergency surgery, bleeding, infections and missed lesions. The patient is to be on a clear liquid diet the entire day prior to the procedure. The patient is to complete the entire prescribed bowel prep the day prior to the procedure as directed.  The patient is told not to drive, drink alcohol, use recreational drugs, exercise, or work the day of the procedure. The patient was told of the need for an escort to accompany the patient home after the procedure. The patient is aware that the procedure may be cancelled if they fail to follow the directions. The patient agreed and will schedule for the procedure. The patient is to be n.p.o. after midnight and bowel prep was given. The patient is to return for the procedure. Upper Endoscopy: I recommend an upper endoscopy to assess for peptic ulcer disease versus esophagitis. The patient was told of the risks and benefits of the procedure. The patient was told of the risks of perforation, emergency surgery, bleeding, infections and missed lesions.  The patient is told not to drive, drink alcohol, use recreational drugs, exercise, or work the day of the procedure. The patient was told of the need for an escort to accompany the patient home after the procedure. The patient is aware that the pro. The patient is to be n.p.o. after midnight. The patient is to return for the procedure. Follow-up: The patient is to follow-up in the office in 4 weeks to reassess the symptoms. The patient was told to call the office if any further problems.

## 2024-09-16 RX ORDER — SODIUM PICOSULFATE, MAGNESIUM OXIDE, AND ANHYDROUS CITRIC ACID 12; 3.5; 1 G/175ML; G/175ML; MG/175ML
10-3.5-12 MG-GM LIQUID ORAL TWICE DAILY
Qty: 2 | Refills: 0 | Status: ACTIVE | COMMUNITY
Start: 2024-09-16 | End: 1900-01-01

## 2024-09-18 RX ORDER — POLYETHYLENE GLYCOL 3350 AND ELECTROLYTES WITH LEMON FLAVOR 236; 22.74; 6.74; 5.86; 2.97 G/4L; G/4L; G/4L; G/4L; G/4L
236 POWDER, FOR SOLUTION ORAL
Qty: 1 | Refills: 0 | Status: ACTIVE | COMMUNITY
Start: 2024-09-18 | End: 1900-01-01

## 2024-09-24 ENCOUNTER — TRANSCRIPTION ENCOUNTER (OUTPATIENT)
Age: 54
End: 2024-09-24

## 2024-09-24 ENCOUNTER — APPOINTMENT (OUTPATIENT)
Dept: GASTROENTEROLOGY | Facility: HOSPITAL | Age: 54
End: 2024-09-24

## 2024-09-24 ENCOUNTER — OUTPATIENT (OUTPATIENT)
Dept: OUTPATIENT SERVICES | Facility: HOSPITAL | Age: 54
LOS: 1 days | End: 2024-09-24
Payer: COMMERCIAL

## 2024-09-24 ENCOUNTER — RESULT REVIEW (OUTPATIENT)
Age: 54
End: 2024-09-24

## 2024-09-24 VITALS
SYSTOLIC BLOOD PRESSURE: 154 MMHG | OXYGEN SATURATION: 100 % | RESPIRATION RATE: 12 BRPM | HEIGHT: 65 IN | WEIGHT: 143.08 LBS | HEART RATE: 95 BPM | TEMPERATURE: 98 F | DIASTOLIC BLOOD PRESSURE: 86 MMHG

## 2024-09-24 VITALS
DIASTOLIC BLOOD PRESSURE: 90 MMHG | SYSTOLIC BLOOD PRESSURE: 149 MMHG | OXYGEN SATURATION: 100 % | RESPIRATION RATE: 13 BRPM | HEART RATE: 62 BPM

## 2024-09-24 DIAGNOSIS — Z12.11 ENCOUNTER FOR SCREENING FOR MALIGNANT NEOPLASM OF COLON: ICD-10-CM

## 2024-09-24 DIAGNOSIS — K21.9 GASTRO-ESOPHAGEAL REFLUX DISEASE WITHOUT ESOPHAGITIS: ICD-10-CM

## 2024-09-24 PROCEDURE — 43239 EGD BIOPSY SINGLE/MULTIPLE: CPT

## 2024-09-24 PROCEDURE — G0121: CPT

## 2024-09-24 PROCEDURE — G0121 COLON CA SCRN NOT HI RSK IND: CPT

## 2024-09-24 PROCEDURE — 88305 TISSUE EXAM BY PATHOLOGIST: CPT

## 2024-09-24 PROCEDURE — 88312 SPECIAL STAINS GROUP 1: CPT | Mod: 26

## 2024-09-24 PROCEDURE — 88312 SPECIAL STAINS GROUP 1: CPT

## 2024-09-24 PROCEDURE — 88305 TISSUE EXAM BY PATHOLOGIST: CPT | Mod: 26

## 2024-09-24 RX ORDER — SODIUM CHLORIDE 0.9 % (FLUSH) 0.9 %
500 SYRINGE (ML) INJECTION
Refills: 0 | Status: COMPLETED | OUTPATIENT
Start: 2024-09-24 | End: 2024-09-24

## 2024-09-24 RX ORDER — SODIUM CHLORIDE IRRIG SOLUTION 0.9 %
1000 SOLUTION, IRRIGATION IRRIGATION
Refills: 0 | Status: DISCONTINUED | OUTPATIENT
Start: 2024-09-24 | End: 2024-10-08

## 2024-09-24 RX ADMIN — Medication 30 MILLILITER(S): at 12:34

## 2024-09-24 NOTE — CHART NOTE - NSCHARTNOTEFT_GEN_A_CORE
History of Present Illness       The patient is a 54-year-old male with past medical history significant for hypertriglyceridemia on fenofibrate who was referred to my office by Dr. Madhav Rico ([phone]) for abdominal pain, dyspepsia and gastroesophageal reflux disease. The patient also came to the office for evaluation for colon cancer screening.  The patient denies any prior exposure to hepatitis A, B or C. The patient denies any large doses of nonsteroidal anti-inflammatory drugs or acetaminophen. The patient denies sharing needles, razors, nail clippers, nail files, scissors, et cetera. The patient admits to prior EtOH abuse but denies any cocaine use and intravenous drug use. The patient denies any prior surgery, blood transfusions, sexual indiscretions, tattoos or piercing. The patient denies any family history of GI or liver problems. The patient states that he is feeling uncomfortable. The patient denies any jaundice or pruritus. The patient complains of occasional lower back pain. The patient denies any abdominal pain. The patient complains of abdominal gas and bloating. The patient denies any nausea or vomiting. The patient complains of gastroesophageal reflux disease but denies any dysphagia. The gastroesophageal reflux disease is worse after meals and late at night and in the early morning. The gastroesophageal reflux disease is improved with proton pump inhibitors, H2 blockers and antacids. The patient denies any atypical chest pain, shortness of breath or palpitations. The patient denies any diaphoresis. The patient denies any constipation or diarrhea. The patient has 1 bowel movement a day. The patient denies a change in bowel habits. The patient denies a change in caliber of stool. The patient denies having mucus discharge with the bowel movements. The patient denies any bright red blood per rectum, melena or hematemesis. The patient denies any rectal pain or rectal pruritus. The patient denies any weight loss or anorexia. He denies any fevers or chills. The patient denies any back pain. The abdominal ultrasound performed on March 4, 2022 revealed a normal abdominal ultrasound and a common bile duct that is in the upper limit of normal. The CAT scan of the abdomen and pelvis with IV contrast performed on March 26, 2024 revealed no acute CT abnormalities of the abdomen/pelvis. Also noted was small fat-containing bilateral inguinal hernias. The blood work performed on March 6, 2024 revealed no evidence of anemia with a hemoglobin/hematocrit level of 13.4/43.3, respectively, an elevated ESR of 23 mm/h, an elevated hemoglobin A1c of 6.1% with an estimated average glucose of 128 mg/dL, a normal ferritin level of 197 ng/mL, a normal TSH of 0.67u IU/mL, a low vitamin D level of 24.1 ng/mL, normal CEA level 2.0 ng/mL, a normal alpha-fetoprotein level of 3.2 ng/mL, a normal CA 19-9 of <2 U/mL, a normal serum iron level of 103 mcg/dL, and elevated TIBC/UIBC of 490/387 mcg/dL, respectively, a normal iron percent saturation 21%, and elevated sodium level of 146 mmol/L, and elevated potassium level of 5.4 mmol/L, a low CO2 of 16 mmol/L, and elevated anion gap at 25 mmol/L, a low albumin level of 5.2 g/dL, normal liver enzymes with a total bilirubin of 0.2 mg/dL, a normal alkaline phosphatase/AST/ALT/GGTP of 42/27/23/18 U/L, respectively. A normal ferritin level of 197 ng/mL, normal TSH of 0.67u IU/mL, a low vitamin D level of 24.1 ng/mL, a negative Endomysial IgA antibody of 1:10, a negative transglutaminase IgG antibody EIA of 2.6 U/ml, a negative transglutaminase IgA antibody EIA of <1.2 U/ml, a negative Gliadin Deamidated IgG antibody of < 5.0 units, a negative Gliadin Deamidated IgA antibody of < 5.0 units, a normal serum IgA level of 202 mg/dL, A negative PATRICK, a negative antimitochondrial antibody of <1: 20, a positive anti-smooth muscle antibody of 1: 20, a negative rheumatoid factor <10 IU/mL, and elevated total cholesterol/triglyceride level of 233/187 mg/dL, respectively. The hepatitis profile performed on March 6, 2024 revealed a reactive hepatitis A IgG antibody, a nonreactive hepatitis A IgM antibody, a nonreactive hepatitis B total core antibody, a nonreactive hepatitis B core IgM antibody, a nonreactive hepatitis B surface antibody, a nonreactive hepatitis B surface antigen, a nonreactive hepatitis B E antigen, a nonreactive hepatitis B E antibody, a nonreactive hepatitis C antibody. The patient was previously evaluated at [facility] emergency room on February 16, 2022 for a 1 month history of left lower quadrant pain radiating to the lower back that exacerbated after meals. The patient had blood work and imaging studies performed in the emergency room to assess the symptoms. The blood work performed on February 16, 2022 revealed no evidence of anemia with a hemoglobin/hematocrit level of 14.4/46.4, respectively, normal liver enzymes with a total bilirubin of 0.3 mg/dL, a normal alkaline phosphatase/AST/ALT of 72/27/24 U/L, respectively, a mildly elevated lipase level of 69 U/L and an elevated hemoglobin A1c of 6.7%. The urinalysis performed on February 16, 2022 revealed trace proteinuria. The urine culture performed on February 16, 2022 revealed no growth. The CAT scan of the abdomen and pelvis with IV contrast performed on February 16, 2022 revealed a prominent pancreatic head without discrete mass suggestive of early pancreatitis. Also noted was no evidence of diverticulitis. The patient was discharged in stable condition and advised to follow-up in the office for further work-up and treatment. The patient admits to having a prior upper endoscopy performed by another gastroenterologist. According to the patient, the upper endoscopic findings revealed acid reflux. The patient denies any significant family history of GI problems.  ?  (-) smoking, (+) prior ETOH use, (-) IVDA  ?  Physical Exam:  General Appearance: no acute distress  ENT: nose clear, ears unremarkable  Eyes: No enteric sclera, conjunctiva clear.  Neck: Supple, without masses  Respiratory: Breath sounds equal and bilateral, no wheezing no rales or rhonchi  Cardiovascular: S1-S2 audible, no murmur, no rubs or gallops  GI: (+) BS, soft, tender diffusely, no rebound, no guarding, no masses  Liver: liver edge palpated  Musculo-skeletal: Good motor strength, good range of motion, normal appearing extremities  Skin: Normal appearing skin, no jaundice, no rashes or nodules  Neurological: without focal motor or sensory deficits Patient is moving all extremities spontaneously and to command with normal muscle strength alert and oriented X3  Psychiatric: Good affect, not depressed, (+) anxious      Radiology Summary    CT: The CAT scan of the abdomen and pelvis with IV contrast performed on March 26, 2024 revealed no acute CT abnormalities of the abdomen/pelvis. Also noted was small fat-containing bilateral inguinal hernias.  ?  The CAT scan of the abdomen and pelvis with IV contrast performed on February 16, 2022 revealed a prominent pancreatic head without discrete mass suggestive of early pancreatitis. Also noted was no evidence of diverticulitis.    Abdominal Sono: The abdominal ultrasound performed on March 4, 2022 revealed a normal abdominal ultrasound and a common bile duct that is in the upper limit of normal.  ?  Active Problems  Abdominal pain, LLQ (left lower quadrant) (789.04) (R10.32)  Abnormal findings on imaging test (793.99) (R93.89)  Colon cancer screening (V76.51) (Z12.11)  Dyspepsia (536.8) (R10.13)  GERD without esophagitis (530.81) (K21.9)  History of pancreatitis (V12.79) (Z87.19)  Hypercholesterolemia with hypertriglyceridemia (272.2) (E78.2)  Preoperative examination (V72.84) (Z01.818)           ?  Current Meds  Dicyclomine HCl - 10 MG Oral Capsule; TAKE 1 CAPSULE 3 TIMES DAILY  Famotidine 40 MG Oral Tablet; Take 1 tablet twice daily  Gemfibrozil 600 MG Oral Tablet; TAKE 1 TABLET TWICE DAILY 30 MINUTES BEFORE  MEALS  Simethicone 125 MG Oral Tablet Chewable; CHEW AND SWALLOW 1 TABLET 4 TIMES  DAILY, AFTER MEALS AND AT BEDTIME           Allergies  No Known Drug Allergies           ?  Vitals  Vital Signs  ?  Recorded: 19Jun2024 08:45AM  Systolic  160, LUE  Diastolic  78, LUE  Height  5 ft 5 in  Weight  145 lb  BMI Calculated  24.13 kg/m2  BSA Calculated  1.73 m2  Temperature  98.1 F  Heart Rate  90  O2 Saturation  98 %, Room Air  FiO2 Flow Rate  0 L/min, Room Air           ?  Assessment  Colon cancer screening (V76.51) (Z12.11)  History of pancreatitis (V12.79) (Z87.19)  GERD without esophagitis (530.81) (K21.9)  Abnormal findings on imaging test (793.99) (R93.89)  Dyspepsia (536.8) (R10.13)    Dyspepsia: The patient complains of dyspeptic symptoms. The patient was advised to continue to abide by an anti-gas (low FOD-MAP) diet. The patient was previously given a pamphlet for anti-gas (low FOD-MAP). The patient and I reviewed the anti-gas (low FOD-MAP)diet at length again. The patient is to continue on a trial of Simethicone one tablet 4 times a day p.r.n. abdominal pain and gas.  GERD: The patient was advised to avoid late-night meals and dietary indiscretions. The patient was advised to avoid fried and fatty foods. The patient was advised to abide by an anti-GERD diet. The patient was given a pamphlet for anti-GERD. The patient and I reviewed the anti-GERD diet at length. I recommend a trial of famotidine 40 mg twice a day x 3 months for the symptoms.  Abnormal Imaging Study: The CAT scan of the abdomen and pelvis with IV contrast performed on February 16, 2022 revealed a prominent pancreatic head without discrete mass suggestive of early pancreatitis. Also noted was no evidence of diverticulitis. The repeat CAT scan of the abdomen and pelvis with IV contrast performed on March 26, 2024 revealed no acute CT abnormalities of the abdomen/pelvis. Also noted was small fat-containing bilateral inguinal hernias.  History of Pancreatitis: The patient has a prior history of ETOH abuse. The patient stopped drinking alcohol for several years. The patient has a history of hypertriglyceridemia. The patient and I had a long discussion regarding acute pancreatitis and its possible complications. The patient is aware of the gravity of the situation. The patient was advised to continue to avoid alcohol. The patient and I had a long discussion regarding the risks of hypertriglyceridemia as a cause of hyperlipidemic pancreatitis. The patient was also told to avoid fried and fatty foods. The patient was advised to follow-up with a nutritionist for a low-fat diet. I recommend a trial of Lopid (Gemfibrozil) 600 mg twice a day for the hypertriglyceridemia. I recommend a CAT scan of the abdomen and pelvis with IV contrast to assess the etiology of the acute pancreatitis and possible complications. The patient was advised to go to the emergency room if the abdominal pain recurs. The patient agrees and will follow-up.  History of Hypertriglyceridemia: The patient has a history of hypertriglyceridemia. The patient and I had a long discussion regarding acute pancreatitis and its possible complications. The patient is aware of the gravity of the situation. The patient was advised to continue to avoid alcohol. The patient and I had a long discussion regarding the risks of hypertriglyceridemia as a cause of hyperlipidemic pancreatitis. The patient was also told to avoid fried and fatty foods. The patient was advised to follow-up with a nutritionist for a low-fat diet. I recommend a trial of Lopid (Gemfibrozil) 600 mg twice a day for the hypertriglyceridemia. I recommend a CAT scan of the abdomen and pelvis with IV contrast to assess the etiology of the acute pancreatitis and possible complications. The patient was advised to go to the emergency room if the abdominal pain recurs. The patient agrees and will follow-up.  Colon Cancer screening: I recommend colonoscopy for colon cancer screening over the age of 45 to assess for colonic polyps. The patient was told of the risks and benefits of the procedure. The patient was told of the risks of perforation, emergency surgery, bleeding, infections and missed lesions. The patient is to be on a clear liquid diet the entire day prior to the procedure. The patient is to complete the entire prescribed bowel prep the day prior to the procedure as directed. The patient is told not to drive, drink alcohol, use recreational drugs, exercise, or work the day of the procedure. The patient was told of the need for an escort to accompany the patient home after the procedure. The patient is aware that the procedure may be cancelled if they fail to follow the directions. The patient agreed and will schedule for the procedure.. The patient is to be n.p.o. after midnight and bowel prep was given. The patient is to return for the procedure.  Colonoscopy: I recommend a colonoscopy to assess the symptoms and for colonic polyps. The patient was told of the risks and benefits of the procedure. The patient was told of the risks of perforation, emergency surgery, bleeding, infections and missed lesions. The patient is to be on a clear liquid diet the entire day prior to the procedure. The patient is to complete the entire prescribed bowel prep the day prior to the procedure as directed. The patient is told not to drive, drink alcohol, use recreational drugs, exercise, or work the day of the procedure. The patient was told of the need for an escort to accompany the patient home after the procedure. The patient is aware that the procedure may be cancelled if they fail to follow the directions. The patient agreed and will schedule for the procedure. The patient is to be n.p.o. after midnight and bowel prep was given. The patient is to return for the procedure.  Upper Endoscopy: I recommend an upper endoscopy to assess for peptic ulcer disease versus esophagitis. The patient was told of the risks and benefits of the procedure. The patient was told of the risks of perforation, emergency surgery, bleeding, infections and missed lesions. The patient is told not to drive, drink alcohol, use recreational drugs, exercise, or work the day of the procedure. The patient was told of the need for an escort to accompany the patient home after the procedure. The patient is aware that the pro. The patient is to be n.p.o. after midnight. The patient is to return for the procedure.  Follow-up: The patient is to follow-up in the office in 4 weeks to reassess the symptoms. The patient was told to call the office if any further problems.            ?  Plan  Colon cancer screening  Start: Clenpiq 10-3.5-12 MG-GM -GM/175ML Oral Solution; TAKE 175 ML Twice daily  Colonoscopy Screening; Status:Hold For - Scheduling; Requested for:19Jun2024;  GERD without esophagitis  Start: Famotidine 40 MG Oral Tablet; Take 1 tablet twice daily  Upper GI Endoscopy; Status:Active; Requested for:19Jun2024;

## 2024-09-24 NOTE — ASU DISCHARGE PLAN (ADULT/PEDIATRIC) - NS MD DC FALL RISK RISK
For information on Fall & Injury Prevention, visit: [email or web address] OR  [email or web address] OR  https://www.cdc.gov/steadi/patient.html

## 2024-09-24 NOTE — ASU PATIENT PROFILE, ADULT - FALL HARM RISK - UNIVERSAL INTERVENTIONS
Bed in lowest position, wheels locked, appropriate side rails in place/Call bell, personal items and telephone in reach/Instruct patient to call for assistance before getting out of bed or chair/Non-slip footwear when patient is out of bed/[place] to call system/Physically safe environment - no spills, clutter or unnecessary equipment/Purposeful Proactive Rounding/Room/bathroom lighting operational, light cord in reach

## 2024-09-26 ENCOUNTER — NON-APPOINTMENT (OUTPATIENT)
Age: 54
End: 2024-09-26

## 2024-09-26 ENCOUNTER — EMERGENCY (EMERGENCY)
Facility: HOSPITAL | Age: 54
LOS: 1 days | Discharge: HOME | End: 2024-09-26
Attending: EMERGENCY MEDICINE
Payer: COMMERCIAL

## 2024-09-26 VITALS
OXYGEN SATURATION: 99 % | RESPIRATION RATE: 16 BRPM | DIASTOLIC BLOOD PRESSURE: 86 MMHG | SYSTOLIC BLOOD PRESSURE: 152 MMHG | TEMPERATURE: 99 F | HEIGHT: 65 IN | WEIGHT: 141.1 LBS | HEART RATE: 99 BPM

## 2024-09-26 VITALS
OXYGEN SATURATION: 98 % | HEART RATE: 78 BPM | DIASTOLIC BLOOD PRESSURE: 78 MMHG | RESPIRATION RATE: 17 BRPM | SYSTOLIC BLOOD PRESSURE: 171 MMHG

## 2024-09-26 PROBLEM — E78.1 PURE HYPERGLYCERIDEMIA: Chronic | Status: ACTIVE | Noted: 2024-09-24

## 2024-09-26 LAB
ALBUMIN SERPL ELPH-MCNC: 5 G/DL — SIGNIFICANT CHANGE UP (ref 3.3–5)
ALP SERPL-CCNC: 49 U/L — SIGNIFICANT CHANGE UP (ref 40–120)
ALT FLD-CCNC: 24 U/L — SIGNIFICANT CHANGE UP (ref 10–45)
ANION GAP SERPL CALC-SCNC: 12 MMOL/L — SIGNIFICANT CHANGE UP (ref 5–17)
AST SERPL-CCNC: 26 U/L — SIGNIFICANT CHANGE UP (ref 10–40)
BASOPHILS # BLD AUTO: 0 K/UL — SIGNIFICANT CHANGE UP (ref 0–0.2)
BASOPHILS NFR BLD AUTO: 0 % — SIGNIFICANT CHANGE UP (ref 0–2)
BILIRUB SERPL-MCNC: 0.3 MG/DL — SIGNIFICANT CHANGE UP (ref 0.2–1.2)
BUN SERPL-MCNC: 11 MG/DL — SIGNIFICANT CHANGE UP (ref 7–23)
CALCIUM SERPL-MCNC: 9.7 MG/DL — SIGNIFICANT CHANGE UP (ref 8.4–10.5)
CHLORIDE SERPL-SCNC: 101 MMOL/L — SIGNIFICANT CHANGE UP (ref 96–108)
CO2 SERPL-SCNC: 25 MMOL/L — SIGNIFICANT CHANGE UP (ref 22–31)
CREAT SERPL-MCNC: 0.94 MG/DL — SIGNIFICANT CHANGE UP (ref 0.5–1.3)
D DIMER BLD IA.RAPID-MCNC: <150 NG/ML DDU — SIGNIFICANT CHANGE UP
DACRYOCYTES BLD QL SMEAR: SLIGHT — SIGNIFICANT CHANGE UP
EGFR: 95 ML/MIN/1.73M2 — SIGNIFICANT CHANGE UP
EGFR: 95 ML/MIN/1.73M2 — SIGNIFICANT CHANGE UP
ELLIPTOCYTES BLD QL SMEAR: SLIGHT — SIGNIFICANT CHANGE UP
EOSINOPHIL # BLD AUTO: 0.1 K/UL — SIGNIFICANT CHANGE UP (ref 0–0.5)
EOSINOPHIL NFR BLD AUTO: 1.7 % — SIGNIFICANT CHANGE UP (ref 0–6)
GLUCOSE SERPL-MCNC: 106 MG/DL — HIGH (ref 70–99)
HCT VFR BLD CALC: 43 % — SIGNIFICANT CHANGE UP (ref 39–50)
HGB BLD-MCNC: 13.4 G/DL — SIGNIFICANT CHANGE UP (ref 13–17)
LIDOCAIN IGE QN: 29 U/L — SIGNIFICANT CHANGE UP (ref 7–60)
LYMPHOCYTES # BLD AUTO: 1.1 K/UL — SIGNIFICANT CHANGE UP (ref 1–3.3)
LYMPHOCYTES # BLD AUTO: 19.3 % — SIGNIFICANT CHANGE UP (ref 13–44)
MANUAL SMEAR VERIFICATION: SIGNIFICANT CHANGE UP
MCHC RBC-ENTMCNC: 22.7 PG — LOW (ref 27–34)
MCHC RBC-ENTMCNC: 31.2 GM/DL — LOW (ref 32–36)
MCV RBC AUTO: 72.9 FL — LOW (ref 80–100)
MONOCYTES # BLD AUTO: 0.25 K/UL — SIGNIFICANT CHANGE UP (ref 0–0.9)
MONOCYTES NFR BLD AUTO: 4.4 % — SIGNIFICANT CHANGE UP (ref 2–14)
NEUTROPHILS # BLD AUTO: 4.24 K/UL — SIGNIFICANT CHANGE UP (ref 1.8–7.4)
NEUTROPHILS NFR BLD AUTO: 74.6 % — SIGNIFICANT CHANGE UP (ref 43–77)
NT-PROBNP SERPL-SCNC: <36 PG/ML — SIGNIFICANT CHANGE UP (ref 0–300)
PLAT MORPH BLD: NORMAL — SIGNIFICANT CHANGE UP
PLATELET # BLD AUTO: 277 K/UL — SIGNIFICANT CHANGE UP (ref 150–400)
POIKILOCYTOSIS BLD QL AUTO: SLIGHT — SIGNIFICANT CHANGE UP
POTASSIUM SERPL-MCNC: 4.2 MMOL/L — SIGNIFICANT CHANGE UP (ref 3.5–5.3)
POTASSIUM SERPL-SCNC: 4.2 MMOL/L — SIGNIFICANT CHANGE UP (ref 3.5–5.3)
PROT SERPL-MCNC: 7.7 G/DL — SIGNIFICANT CHANGE UP (ref 6–8.3)
RBC # BLD: 5.9 M/UL — HIGH (ref 4.2–5.8)
RBC # FLD: 14.7 % — HIGH (ref 10.3–14.5)
RBC BLD AUTO: ABNORMAL
SODIUM SERPL-SCNC: 138 MMOL/L — SIGNIFICANT CHANGE UP (ref 135–145)
SURGICAL PATHOLOGY STUDY: SIGNIFICANT CHANGE UP
TROPONIN T, HIGH SENSITIVITY RESULT: 15 NG/L — SIGNIFICANT CHANGE UP (ref 0–51)
WBC # BLD: 5.69 K/UL — SIGNIFICANT CHANGE UP (ref 3.8–10.5)
WBC # FLD AUTO: 5.69 K/UL — SIGNIFICANT CHANGE UP (ref 3.8–10.5)

## 2024-09-26 PROCEDURE — 85379 FIBRIN DEGRADATION QUANT: CPT

## 2024-09-26 PROCEDURE — 83690 ASSAY OF LIPASE: CPT

## 2024-09-26 PROCEDURE — 71046 X-RAY EXAM CHEST 2 VIEWS: CPT | Mod: 26

## 2024-09-26 PROCEDURE — 93005 ELECTROCARDIOGRAM TRACING: CPT

## 2024-09-26 PROCEDURE — 96374 THER/PROPH/DIAG INJ IV PUSH: CPT | Mod: XU

## 2024-09-26 PROCEDURE — 74174 CTA ABD&PLVS W/CONTRAST: CPT | Mod: 26,MC

## 2024-09-26 PROCEDURE — 71046 X-RAY EXAM CHEST 2 VIEWS: CPT

## 2024-09-26 PROCEDURE — 84484 ASSAY OF TROPONIN QUANT: CPT

## 2024-09-26 PROCEDURE — 36415 COLL VENOUS BLD VENIPUNCTURE: CPT

## 2024-09-26 PROCEDURE — 99285 EMERGENCY DEPT VISIT HI MDM: CPT | Mod: 25

## 2024-09-26 PROCEDURE — 80053 COMPREHEN METABOLIC PANEL: CPT

## 2024-09-26 PROCEDURE — 85025 COMPLETE CBC W/AUTO DIFF WBC: CPT

## 2024-09-26 PROCEDURE — 99285 EMERGENCY DEPT VISIT HI MDM: CPT

## 2024-09-26 PROCEDURE — 83880 ASSAY OF NATRIURETIC PEPTIDE: CPT

## 2024-09-26 PROCEDURE — 74174 CTA ABD&PLVS W/CONTRAST: CPT | Mod: MC

## 2024-09-26 RX ORDER — MAGNESIUM, ALUMINUM HYDROXIDE 200-200 MG
30 TABLET,CHEWABLE ORAL ONCE
Refills: 0 | Status: COMPLETED | OUTPATIENT
Start: 2024-09-26 | End: 2024-09-26

## 2024-09-26 RX ADMIN — Medication 30 MILLILITER(S): at 12:02

## 2024-09-26 RX ADMIN — Medication 20 MILLIGRAM(S): at 12:02

## 2024-09-27 ENCOUNTER — NON-APPOINTMENT (OUTPATIENT)
Age: 54
End: 2024-09-27

## 2024-10-23 ENCOUNTER — APPOINTMENT (OUTPATIENT)
Dept: GASTROENTEROLOGY | Facility: CLINIC | Age: 54
End: 2024-10-23
Payer: COMMERCIAL

## 2024-10-23 VITALS
DIASTOLIC BLOOD PRESSURE: 72 MMHG | BODY MASS INDEX: 23.66 KG/M2 | WEIGHT: 142 LBS | HEIGHT: 65 IN | OXYGEN SATURATION: 97 % | TEMPERATURE: 97.9 F | HEART RATE: 85 BPM | SYSTOLIC BLOOD PRESSURE: 143 MMHG

## 2024-10-23 DIAGNOSIS — K64.8 OTHER HEMORRHOIDS: ICD-10-CM

## 2024-10-23 DIAGNOSIS — E78.2 MIXED HYPERLIPIDEMIA: ICD-10-CM

## 2024-10-23 DIAGNOSIS — Z87.19 PERSONAL HISTORY OF OTHER DISEASES OF THE DIGESTIVE SYSTEM: ICD-10-CM

## 2024-10-23 DIAGNOSIS — R10.13 EPIGASTRIC PAIN: ICD-10-CM

## 2024-10-23 DIAGNOSIS — R93.89 ABNORMAL FINDINGS ON DIAGNOSTIC IMAGING OF OTHER SPECIFIED BODY STRUCTURES: ICD-10-CM

## 2024-10-23 PROCEDURE — 99214 OFFICE O/P EST MOD 30 MIN: CPT

## 2024-10-23 RX ORDER — HYDROCORTISONE 25 MG/G
2.5 CREAM TOPICAL 3 TIMES DAILY
Qty: 2 | Refills: 3 | Status: ACTIVE | COMMUNITY
Start: 2024-10-23 | End: 1900-01-01

## 2024-10-23 RX ORDER — PANTOPRAZOLE 40 MG/1
40 TABLET, DELAYED RELEASE ORAL DAILY
Qty: 30 | Refills: 2 | Status: ACTIVE | COMMUNITY
Start: 2024-10-23 | End: 1900-01-01

## 2024-10-23 RX ORDER — SIMETHICONE 180 MG
180 CAPSULE ORAL 4 TIMES DAILY
Qty: 120 | Refills: 3 | Status: ACTIVE | COMMUNITY
Start: 2024-10-23 | End: 1900-01-01

## 2025-04-23 ENCOUNTER — APPOINTMENT (OUTPATIENT)
Dept: GASTROENTEROLOGY | Facility: CLINIC | Age: 55
End: 2025-04-23

## (undated) DEVICE — FORMALIN CUPS 10% BUFFERED

## (undated) DEVICE — KIT ENDO PROCEDURE CUST W/VLV

## (undated) DEVICE — SOLIDIFIER ISOLYZER 2000 CC

## (undated) DEVICE — SYR LUER LOK 50CC

## (undated) DEVICE — CATH ELCTR GLIDE PRB 7FR

## (undated) DEVICE — VENODYNE/SCD SLEEVE CALF MEDIUM

## (undated) DEVICE — SENSOR O2 FINGER ADULT

## (undated) DEVICE — STERIS DEFENDO 3-PIECE KIT (AIR/WATER, SUCTION & BIOPSY VALVES)

## (undated) DEVICE — SOL INJ NS 0.9% 500ML 1-PORT

## (undated) DEVICE — SNARE LOOP POLY DISP 30MM LOOP

## (undated) DEVICE — LUBRICATING JELLY ONESHOT 1.25OZ

## (undated) DEVICE — DRSG CURITY GAUZE SPONGE 4 X 4" 12-PLY

## (undated) DEVICE — CLAMP BX HOT RAD JAW 3

## (undated) DEVICE — TUBING MEDI-VAC W MAXIGRIP CONNECTORS 1/4"X6'

## (undated) DEVICE — NDL INJ SCLERO INTERJECT 23G

## (undated) DEVICE — MASK O2 MICROSTREAM SAMPLE LINE MED/LRG 10FT

## (undated) DEVICE — TUBING IV SET GRAVITY 3Y 100" MACRO

## (undated) DEVICE — FORCEP BIOPSY 2.5MM DISP

## (undated) DEVICE — BITE BLOCK ADULT 20 X 27MM (GREEN)

## (undated) DEVICE — ADAPTER ENDO CHNL SINGLE USE

## (undated) DEVICE — TUBING CANNULA SALTER LABS NASAL ADULT 7FT

## (undated) DEVICE — VALVE ENDO SURESEAL II 0-5FR

## (undated) DEVICE — RETRIEVER ROTH NET PLATINUM-UNIVERSAL

## (undated) DEVICE — FORCEP RADIAL JAW 4 W NDL 2.2MM 2.8MM 240CM ORANGE DISP